# Patient Record
Sex: FEMALE | Race: WHITE | NOT HISPANIC OR LATINO | URBAN - METROPOLITAN AREA
[De-identification: names, ages, dates, MRNs, and addresses within clinical notes are randomized per-mention and may not be internally consistent; named-entity substitution may affect disease eponyms.]

---

## 2019-12-11 ENCOUNTER — OFFICE VISIT (OUTPATIENT)
Dept: URBAN - METROPOLITAN AREA CLINIC 14 | Facility: CLINIC | Age: 79
End: 2019-12-11
Payer: COMMERCIAL

## 2019-12-11 PROCEDURE — 92004 COMPRE OPH EXAM NEW PT 1/>: CPT | Performed by: OPHTHALMOLOGY

## 2019-12-11 RX ORDER — CYCLOSPORINE 0.5 MG/ML
0.05 % EMULSION OPHTHALMIC
Qty: 90 | Refills: 4 | Status: INACTIVE
Start: 2019-12-11 | End: 2020-06-03

## 2019-12-11 RX ORDER — LOTEPREDNOL ETABONATE 2 MG/ML
0.2 % SUSPENSION/ DROPS OPHTHALMIC
Qty: 5 | Refills: 0 | Status: INACTIVE
Start: 2019-12-11 | End: 2020-06-03

## 2019-12-11 ASSESSMENT — INTRAOCULAR PRESSURE
OS: 17
OD: 17

## 2019-12-11 NOTE — IMPRESSION/PLAN
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 Plan: No urgent need for conj cautery, as pts. symptoms are better now vs. in summer. , recommend gtt regimen first. 
Pt to start Restasis bid ou and Alrex bid ou.  Pt to start Alrex, pharmacy called and Alrex is not covered ok's generic, for 2 days then start with Restasis w/ alrex, alrex first then 10 min later use Restasis, x 2 weeks then d/c Alrex and continue with Restasis only ou

## 2020-06-03 ENCOUNTER — OFFICE VISIT (OUTPATIENT)
Dept: URBAN - METROPOLITAN AREA CLINIC 14 | Facility: CLINIC | Age: 80
End: 2020-06-03
Payer: COMMERCIAL

## 2020-06-03 DIAGNOSIS — H02.822 CYSTS OF RIGHT LOWER EYELID: ICD-10-CM

## 2020-06-03 DIAGNOSIS — H04.123 DRY EYE SYNDROME OF BILATERAL LACRIMAL GLANDS: Primary | ICD-10-CM

## 2020-06-03 PROCEDURE — 92012 INTRM OPH EXAM EST PATIENT: CPT | Performed by: OPHTHALMOLOGY

## 2020-06-03 RX ORDER — LOTEPREDNOL ETABONATE 2 MG/ML
0.2 % SUSPENSION/ DROPS OPHTHALMIC
Qty: 1 | Refills: 1 | Status: ACTIVE
Start: 2020-06-03

## 2020-06-03 RX ORDER — CYCLOSPORINE 0.5 MG/ML
0.05 % EMULSION OPHTHALMIC
Qty: 3 | Refills: 3 | Status: ACTIVE
Start: 2020-06-03

## 2020-06-03 ASSESSMENT — INTRAOCULAR PRESSURE
OD: 19
OS: 19

## 2020-06-03 NOTE — IMPRESSION/PLAN
Impression: Conjunctivochalasis, bilateral: J70.460. Plan: No changes since last visit. Recommend to monitor for any changes.

## 2020-06-03 NOTE — IMPRESSION/PLAN
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 Plan: Continue Restasis BID OU and re start Alrex BID OU x 10 days with Restasis. Advised to use only BID and not more. Start using Soothe QID OU(gave sample). Also recommend Pataday PRN OU-for itching.

## 2020-06-03 NOTE — IMPRESSION/PLAN
Impression: Cysts of right lower eyelid: H02.822. Plan: Discussed monitor cyst for any changes. Discussed removal of cyst, don't recommend at this time. R,B,A reviewed for removing cyst. Reassured not cancer related.

## 2020-10-16 ENCOUNTER — OFFICE VISIT (OUTPATIENT)
Dept: URBAN - METROPOLITAN AREA CLINIC 14 | Facility: CLINIC | Age: 80
End: 2020-10-16
Payer: COMMERCIAL

## 2020-10-16 DIAGNOSIS — H11.823 CONJUNCTIVOCHALASIS, BILATERAL: Primary | ICD-10-CM

## 2020-10-16 PROCEDURE — 99213 OFFICE O/P EST LOW 20 MIN: CPT | Performed by: OPHTHALMOLOGY

## 2020-10-16 ASSESSMENT — INTRAOCULAR PRESSURE
OD: 18
OS: 18

## 2020-10-16 NOTE — IMPRESSION/PLAN
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 Plan: Dry eyes account for the patient's complaints. Educated pt on diagnosis and that condition does not have a cure and will need continues therapy, pt voiced understanding. Recommend pt to use AFT at least 1 gtt qid and genteal qhs. Pt may also use warm compresses to help the glands open and function properly. Discussed using travis and gel drops for comfort.

## 2020-10-16 NOTE — IMPRESSION/PLAN
Impression: Conjunctivochalasis, bilateral: G44.582. Plan: Discussed diagnosis in detail with patient. Discussed treatment options with patient. Advised patient of condition. Discussed with patient that if gel drops and AFT travis do not help with symptoms, will consider do cauterization. Patient voiced understanding.

## 2020-12-11 ENCOUNTER — OFFICE VISIT (OUTPATIENT)
Dept: URBAN - METROPOLITAN AREA CLINIC 14 | Facility: CLINIC | Age: 80
End: 2020-12-11
Payer: COMMERCIAL

## 2020-12-11 PROCEDURE — 99213 OFFICE O/P EST LOW 20 MIN: CPT | Performed by: OPHTHALMOLOGY

## 2020-12-11 ASSESSMENT — INTRAOCULAR PRESSURE
OS: 18
OD: 16

## 2020-12-11 NOTE — IMPRESSION/PLAN
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 Plan: BUL and BLL puncta cauterized. Recommend patient continue with Restasis OU BID and continue with AFT and GenTeal drops. If patient has showed improvement in 3 months, will continue to see her yearly. If symptoms have not improved or have gotten worse, will consider cauterizing conjunctivolchalasis in one eye to see if patient appreciates improvement. Will schedule patient in minor procedure slot with anticipation of possible cauterization. Patient understands and agrees with plan.

## 2021-03-17 ENCOUNTER — OFFICE VISIT (OUTPATIENT)
Dept: URBAN - METROPOLITAN AREA CLINIC 14 | Facility: CLINIC | Age: 81
End: 2021-03-17
Payer: COMMERCIAL

## 2021-03-17 PROCEDURE — 68110 EXC LES CONJUNCTIVA <1 CM: CPT | Performed by: OPHTHALMOLOGY

## 2021-03-17 NOTE — IMPRESSION/PLAN
Impression: Conjunctivochalasis, bilateral: N10.122. Plan: R/B/A to minor procedure of conjunctival cautery expl/d to pt, and she agrees to same. After multiple proparacaine drops, and a pledget of same, cautery burns were applied to the infero-temporal redundant conjunctiva. No complications. 
Rec AT q1-2h wa prn

## 2021-03-17 NOTE — IMPRESSION/PLAN
Impression: Conjunctivochalasis, bilateral: Y47.536. Plan: Discussed diagnosis in detail with patient. Discussed treatment options with patient. Emphasized and explained compliance. Reassured patient of current condition and treatment. preformed Conjunctival Cautery temporally OD today. discussed with Pt. that if this does not help the symptoms will consider second opinion with corneal spec. t. to continue Restasis BID OU and call with any troubles.  Advised pt. will have gritty feeling

## 2021-03-24 ENCOUNTER — OFFICE VISIT (OUTPATIENT)
Dept: URBAN - METROPOLITAN AREA CLINIC 14 | Facility: CLINIC | Age: 81
End: 2021-03-24
Payer: COMMERCIAL

## 2021-03-24 PROCEDURE — 99024 POSTOP FOLLOW-UP VISIT: CPT | Performed by: OPHTHALMOLOGY

## 2021-03-24 RX ORDER — KETOROLAC TROMETHAMINE 5 MG/ML
0.5 % SOLUTION OPHTHALMIC
Qty: 5 | Refills: 0 | Status: ACTIVE
Start: 2021-03-24

## 2021-03-24 NOTE — IMPRESSION/PLAN
Impression: Conjunctivochalasis, bilateral: N66.047. Plan: Patient is healing well s/p conj cautery OD. Discussed using frequent AFT usage and placing the eye drops in the fridge as cool drops can be soothing to the eye. Patient can start Ketorolac OD QID if pain gets unbearable - ERx'd into pharmacy. Advised patient that she can use cool compresses if needed.